# Patient Record
Sex: MALE | Race: OTHER | Employment: UNEMPLOYED | URBAN - NONMETROPOLITAN AREA
[De-identification: names, ages, dates, MRNs, and addresses within clinical notes are randomized per-mention and may not be internally consistent; named-entity substitution may affect disease eponyms.]

---

## 2022-08-29 ENCOUNTER — APPOINTMENT (OUTPATIENT)
Dept: CT IMAGING | Age: 37
End: 2022-08-29

## 2022-08-29 ENCOUNTER — HOSPITAL ENCOUNTER (EMERGENCY)
Age: 37
Discharge: HOME | End: 2022-08-30

## 2022-08-29 DIAGNOSIS — R79.89 ELEVATED LFTS: ICD-10-CM

## 2022-08-29 DIAGNOSIS — R44.3 HALLUCINATION: Primary | ICD-10-CM

## 2022-08-29 DIAGNOSIS — R41.0 CONFUSION: ICD-10-CM

## 2022-08-29 DIAGNOSIS — F10.11 HISTORY OF ALCOHOL ABUSE: ICD-10-CM

## 2022-08-29 LAB
ALBUMIN SERPL-MCNC: 4.8 G/DL (ref 3.5–5.2)
ALP BLD-CCNC: 164 U/L (ref 40–130)
ALT SERPL-CCNC: 242 U/L (ref 5–41)
AMPHETAMINE SCREEN, URINE: NEGATIVE
ANION GAP SERPL CALCULATED.3IONS-SCNC: 15 MMOL/L (ref 7–19)
AST SERPL-CCNC: 325 U/L (ref 5–40)
BACTERIA: NEGATIVE /HPF
BARBITURATE SCREEN URINE: NEGATIVE
BASOPHILS ABSOLUTE: 0 K/UL (ref 0–0.2)
BASOPHILS RELATIVE PERCENT: 0.8 % (ref 0–1)
BENZODIAZEPINE SCREEN, URINE: POSITIVE
BILIRUB SERPL-MCNC: 1.8 MG/DL (ref 0.2–1.2)
BILIRUBIN URINE: ABNORMAL
BLOOD, URINE: NEGATIVE
BUN BLDV-MCNC: 11 MG/DL (ref 6–20)
BUPRENORPHINE URINE: NEGATIVE
CALCIUM SERPL-MCNC: 9.2 MG/DL (ref 8.6–10)
CANNABINOID SCREEN URINE: NEGATIVE
CHLORIDE BLD-SCNC: 102 MMOL/L (ref 98–111)
CLARITY: CLEAR
CO2: 20 MMOL/L (ref 22–29)
COCAINE METABOLITE SCREEN URINE: NEGATIVE
COLOR: ABNORMAL
CREAT SERPL-MCNC: 0.7 MG/DL (ref 0.5–1.2)
CRYSTALS, UA: ABNORMAL /HPF
EOSINOPHILS ABSOLUTE: 0.1 K/UL (ref 0–0.6)
EOSINOPHILS RELATIVE PERCENT: 2.4 % (ref 0–5)
EPITHELIAL CELLS, UA: 0 /HPF (ref 0–5)
ETHANOL: <10 MG/DL (ref 0–0.08)
GFR AFRICAN AMERICAN: >59
GFR NON-AFRICAN AMERICAN: >60
GLUCOSE BLD-MCNC: 97 MG/DL (ref 74–109)
GLUCOSE URINE: NEGATIVE MG/DL
HCT VFR BLD CALC: 38 % (ref 42–52)
HEMOGLOBIN: 12.6 G/DL (ref 14–18)
HYALINE CASTS: 0 /HPF (ref 0–8)
IMMATURE GRANULOCYTES #: 0 K/UL
KETONES, URINE: NEGATIVE MG/DL
LEUKOCYTE ESTERASE, URINE: ABNORMAL
LYMPHOCYTES ABSOLUTE: 0.9 K/UL (ref 1.1–4.5)
LYMPHOCYTES RELATIVE PERCENT: 17.8 % (ref 20–40)
Lab: ABNORMAL
MAGNESIUM: 2.2 MG/DL (ref 1.6–2.6)
MCH RBC QN AUTO: 31.6 PG (ref 27–31)
MCHC RBC AUTO-ENTMCNC: 33.2 G/DL (ref 33–37)
MCV RBC AUTO: 95.2 FL (ref 80–94)
METHADONE SCREEN, URINE: NEGATIVE
METHAMPHETAMINE, URINE: NEGATIVE
MONOCYTES ABSOLUTE: 0.6 K/UL (ref 0–0.9)
MONOCYTES RELATIVE PERCENT: 13.1 % (ref 0–10)
NEUTROPHILS ABSOLUTE: 3.2 K/UL (ref 1.5–7.5)
NEUTROPHILS RELATIVE PERCENT: 65.7 % (ref 50–65)
NITRITE, URINE: NEGATIVE
OPIATE SCREEN URINE: NEGATIVE
OXYCODONE URINE: NEGATIVE
PDW BLD-RTO: 12.2 % (ref 11.5–14.5)
PH UA: 5.5 (ref 5–8)
PHENCYCLIDINE SCREEN URINE: NEGATIVE
PLATELET # BLD: 138 K/UL (ref 130–400)
PMV BLD AUTO: 10.5 FL (ref 9.4–12.4)
POTASSIUM SERPL-SCNC: 3.7 MMOL/L (ref 3.5–5)
PROPOXYPHENE SCREEN: NEGATIVE
PROTEIN UA: NEGATIVE MG/DL
RBC # BLD: 3.99 M/UL (ref 4.7–6.1)
RBC UA: 0 /HPF (ref 0–4)
SARS-COV-2, NAAT: NOT DETECTED
SODIUM BLD-SCNC: 137 MMOL/L (ref 136–145)
SPECIFIC GRAVITY UA: 1.02 (ref 1–1.03)
TOTAL PROTEIN: 7.9 G/DL (ref 6.6–8.7)
TRICYCLIC, URINE: NEGATIVE
UROBILINOGEN, URINE: 1 E.U./DL
WBC # BLD: 4.9 K/UL (ref 4.8–10.8)
WBC UA: 0 /HPF (ref 0–5)

## 2022-08-29 PROCEDURE — 85025 COMPLETE CBC W/AUTO DIFF WBC: CPT

## 2022-08-29 PROCEDURE — 36415 COLL VENOUS BLD VENIPUNCTURE: CPT

## 2022-08-29 PROCEDURE — 81001 URINALYSIS AUTO W/SCOPE: CPT

## 2022-08-29 PROCEDURE — 6360000002 HC RX W HCPCS: Performed by: PHYSICIAN ASSISTANT

## 2022-08-29 PROCEDURE — 96374 THER/PROPH/DIAG INJ IV PUSH: CPT

## 2022-08-29 PROCEDURE — 99285 EMERGENCY DEPT VISIT HI MDM: CPT

## 2022-08-29 PROCEDURE — 83735 ASSAY OF MAGNESIUM: CPT

## 2022-08-29 PROCEDURE — 70450 CT HEAD/BRAIN W/O DYE: CPT

## 2022-08-29 PROCEDURE — 80306 DRUG TEST PRSMV INSTRMNT: CPT

## 2022-08-29 PROCEDURE — 74177 CT ABD & PELVIS W/CONTRAST: CPT

## 2022-08-29 PROCEDURE — 82077 ASSAY SPEC XCP UR&BREATH IA: CPT

## 2022-08-29 PROCEDURE — 6360000004 HC RX CONTRAST MEDICATION: Performed by: PHYSICIAN ASSISTANT

## 2022-08-29 PROCEDURE — 87635 SARS-COV-2 COVID-19 AMP PRB: CPT

## 2022-08-29 PROCEDURE — 96375 TX/PRO/DX INJ NEW DRUG ADDON: CPT

## 2022-08-29 PROCEDURE — 2580000003 HC RX 258: Performed by: PHYSICIAN ASSISTANT

## 2022-08-29 PROCEDURE — 80053 COMPREHEN METABOLIC PANEL: CPT

## 2022-08-29 RX ORDER — DIAZEPAM 5 MG/1
5 TABLET ORAL EVERY 8 HOURS PRN
COMMUNITY

## 2022-08-29 RX ORDER — 0.9 % SODIUM CHLORIDE 0.9 %
1000 INTRAVENOUS SOLUTION INTRAVENOUS ONCE
Status: COMPLETED | OUTPATIENT
Start: 2022-08-29 | End: 2022-08-29

## 2022-08-29 RX ORDER — THIAMINE HYDROCHLORIDE 100 MG/ML
100 INJECTION, SOLUTION INTRAMUSCULAR; INTRAVENOUS DAILY
Status: DISCONTINUED | OUTPATIENT
Start: 2022-08-29 | End: 2022-08-30 | Stop reason: ALTCHOICE

## 2022-08-29 RX ORDER — LORAZEPAM 2 MG/ML
1 INJECTION INTRAMUSCULAR ONCE
Status: COMPLETED | OUTPATIENT
Start: 2022-08-29 | End: 2022-08-29

## 2022-08-29 RX ORDER — ONDANSETRON 2 MG/ML
4 INJECTION INTRAMUSCULAR; INTRAVENOUS ONCE
Status: COMPLETED | OUTPATIENT
Start: 2022-08-29 | End: 2022-08-29

## 2022-08-29 RX ADMIN — THIAMINE HYDROCHLORIDE 100 MG: 100 INJECTION, SOLUTION INTRAMUSCULAR; INTRAVENOUS at 21:23

## 2022-08-29 RX ADMIN — ONDANSETRON 4 MG: 2 INJECTION INTRAMUSCULAR; INTRAVENOUS at 21:22

## 2022-08-29 RX ADMIN — IOPAMIDOL 70 ML: 755 INJECTION, SOLUTION INTRAVENOUS at 21:22

## 2022-08-29 RX ADMIN — SODIUM CHLORIDE 1000 ML: 9 INJECTION, SOLUTION INTRAVENOUS at 21:23

## 2022-08-29 RX ADMIN — LORAZEPAM 1 MG: 2 INJECTION, SOLUTION INTRAMUSCULAR; INTRAVENOUS at 22:50

## 2022-08-29 ASSESSMENT — ENCOUNTER SYMPTOMS
VOMITING: 0
NAUSEA: 0

## 2022-08-30 VITALS
SYSTOLIC BLOOD PRESSURE: 124 MMHG | DIASTOLIC BLOOD PRESSURE: 76 MMHG | OXYGEN SATURATION: 97 % | TEMPERATURE: 98.5 F | HEIGHT: 68 IN | RESPIRATION RATE: 20 BRPM | WEIGHT: 130 LBS | BODY MASS INDEX: 19.7 KG/M2 | HEART RATE: 67 BPM

## 2022-08-30 LAB
ALBUMIN SERPL-MCNC: 4.2 G/DL (ref 3.5–5.2)
ALP BLD-CCNC: 146 U/L (ref 40–130)
ALT SERPL-CCNC: 205 U/L (ref 5–41)
AMMONIA: 56 UMOL/L (ref 16–60)
ANION GAP SERPL CALCULATED.3IONS-SCNC: 12 MMOL/L (ref 7–19)
AST SERPL-CCNC: 241 U/L (ref 5–40)
BILIRUB SERPL-MCNC: 1.8 MG/DL (ref 0.2–1.2)
BUN BLDV-MCNC: 9 MG/DL (ref 6–20)
CALCIUM SERPL-MCNC: 8.6 MG/DL (ref 8.6–10)
CHLORIDE BLD-SCNC: 105 MMOL/L (ref 98–111)
CO2: 22 MMOL/L (ref 22–29)
CREAT SERPL-MCNC: 0.6 MG/DL (ref 0.5–1.2)
GFR AFRICAN AMERICAN: >59
GFR NON-AFRICAN AMERICAN: >60
GLUCOSE BLD-MCNC: 112 MG/DL (ref 74–109)
HCT VFR BLD CALC: 35.6 % (ref 42–52)
HEMOGLOBIN: 11.8 G/DL (ref 14–18)
MCH RBC QN AUTO: 31.7 PG (ref 27–31)
MCHC RBC AUTO-ENTMCNC: 33.1 G/DL (ref 33–37)
MCV RBC AUTO: 95.7 FL (ref 80–94)
PDW BLD-RTO: 12.3 % (ref 11.5–14.5)
PLATELET # BLD: 125 K/UL (ref 130–400)
PMV BLD AUTO: 10.4 FL (ref 9.4–12.4)
POTASSIUM SERPL-SCNC: 3.7 MMOL/L (ref 3.5–5)
RBC # BLD: 3.72 M/UL (ref 4.7–6.1)
SODIUM BLD-SCNC: 139 MMOL/L (ref 136–145)
TOTAL PROTEIN: 6.8 G/DL (ref 6.6–8.7)
WBC # BLD: 4.1 K/UL (ref 4.8–10.8)

## 2022-08-30 PROCEDURE — 82140 ASSAY OF AMMONIA: CPT

## 2022-08-30 PROCEDURE — 6360000002 HC RX W HCPCS

## 2022-08-30 PROCEDURE — 36415 COLL VENOUS BLD VENIPUNCTURE: CPT

## 2022-08-30 PROCEDURE — 2580000003 HC RX 258: Performed by: INTERNAL MEDICINE

## 2022-08-30 PROCEDURE — 85027 COMPLETE CBC AUTOMATED: CPT

## 2022-08-30 PROCEDURE — 80053 COMPREHEN METABOLIC PANEL: CPT

## 2022-08-30 RX ORDER — HALOPERIDOL 5 MG/ML
10 INJECTION INTRAMUSCULAR ONCE
Status: COMPLETED | OUTPATIENT
Start: 2022-08-30 | End: 2022-08-30

## 2022-08-30 RX ORDER — CYANOCOBALAMIN 1000 UG/ML
1000 INJECTION INTRAMUSCULAR; SUBCUTANEOUS ONCE
Status: DISCONTINUED | OUTPATIENT
Start: 2022-08-30 | End: 2022-08-30

## 2022-08-30 RX ORDER — HALOPERIDOL 5 MG/ML
10 INJECTION INTRAMUSCULAR ONCE
Status: DISCONTINUED | OUTPATIENT
Start: 2022-08-30 | End: 2022-08-30

## 2022-08-30 RX ORDER — CYANOCOBALAMIN 1000 UG/ML
1000 INJECTION INTRAMUSCULAR; SUBCUTANEOUS ONCE
Status: DISCONTINUED | OUTPATIENT
Start: 2022-08-30 | End: 2022-08-30 | Stop reason: HOSPADM

## 2022-08-30 RX ORDER — THIAMINE HYDROCHLORIDE 100 MG/ML
200 INJECTION, SOLUTION INTRAMUSCULAR; INTRAVENOUS EVERY 12 HOURS
Status: DISCONTINUED | OUTPATIENT
Start: 2022-08-30 | End: 2022-08-30 | Stop reason: HOSPADM

## 2022-08-30 RX ORDER — HALOPERIDOL 5 MG/ML
INJECTION INTRAMUSCULAR
Status: COMPLETED
Start: 2022-08-30 | End: 2022-08-30

## 2022-08-30 RX ORDER — 0.9 % SODIUM CHLORIDE 0.9 %
500 INTRAVENOUS SOLUTION INTRAVENOUS ONCE
Status: COMPLETED | OUTPATIENT
Start: 2022-08-30 | End: 2022-08-30

## 2022-08-30 RX ADMIN — HALOPERIDOL 10 MG: 5 INJECTION INTRAMUSCULAR at 00:52

## 2022-08-30 RX ADMIN — HALOPERIDOL LACTATE 10 MG: 5 INJECTION, SOLUTION INTRAMUSCULAR at 00:52

## 2022-08-30 RX ADMIN — SODIUM CHLORIDE 500 ML: 9 INJECTION, SOLUTION INTRAVENOUS at 05:37

## 2022-08-30 NOTE — ED NOTES
Pt's friend speaking with Magdiel Price, [address] in hallway at this time. PT guided back to bed twice within 4 minutes to eat his meal. Pt looking under his sheets and under cabinets and appears very nervous. Pt asked what he is seeing and he said \"I am looking for my phone. \" Pt back in bed and eating at this time.       Kristine Celeste RN  08/30/22 [id number]

## 2022-08-30 NOTE — ED NOTES
PT resting in bed with spouse and friend at bedside. Pt began pulling at his cardiac monitor and becoming increasingly agitated. Pt raised his fist at nurse twice but didn't try to hit her. Security notified. Emotional support provided to pt's family. Cardiac monitoring maintained 1-1 observation maintained from nurses station.       Stephanie Zavaleta RN  08/30/22 2845

## 2022-08-30 NOTE — ED NOTES
Pt pacing in room, pt guided back into bed without incident. PT appears confused and verbalizes he is still hallucinating. Emotional support provided. No distress noted.       Jed Scherer RN  08/30/22 [id number]

## 2022-08-30 NOTE — CARE COORDINATION
ELIZ returned call to ED re: bed 6 that has been here 18h and has no ride back to [facility]; spoke with RN-Kirt; he stated that [facility] doesn't have anyone to pick him up here or even at bus stop; his brother left last night and he and all family live in [place] and don't have the money to come pick him up; ELIZ checked with Ernesto-will be more costly than a cab; ELIZ placed call to [facility] cabs; they can arrive in [address] and the cost is 96.00 ([facility] would be 135.00-canceled);     ELIZ faxed cab voucher to     Electronically signed by Clelia Brunner, LSW on 8/30/2022 at 2:55 PM

## 2022-08-30 NOTE — ED NOTES
PT wondering around his room while his friend stepped into hallway. PT removed BP cuff and is fidgeting with equipment. PT guided back to bed, BP cuff and monitor reapplied. Pt still appears confused and unimproved after Ativan earlier. Pt's friend expressed concern about him being discharged home in this state with 3 children at home. Herminia Rivero made aware.       Erendira Weinberg RN  08/30/22 8989

## 2022-08-30 NOTE — SUICIDE SAFETY PLAN
SAFETY PLAN    A suicide Safety Plan is a document that supports someone when they are having thoughts of suicide. Warning Signs that indicate a suicidal crisis may be developing: What (situations, thoughts, feelings, body sensations, behaviors, etc.) do you experience that lets you know you are beginning to think about suicide? 1. Hallucinations  2.   3. Internal Coping Strategies:  What things can I do (relaxation techniques, hobbies, physical activities, etc.) to take my mind off my problems without contacting another person? 1. Stay busy  2.   3.     People and social settings that provide distraction: Who can I call or where can I go to distract me? 1. Name: My brother  Phone:   2. Name: My wife  Phone:   3. Place:       4. Place:     People whom I can ask for help: Who can I call when I need help - for example, friends, family, clergy, someone else? 1. Name:    My brother            Phone:   2. Name:    My wife             Phone:   3. Name:                     Phone:       Professionals or [address] [place] [address] I can contact during a crisis: Who can I call for help - for example, my doctor, my psychiatrist, my psychologist, a mental health provider, a suicide hotline? 1. Clinician Name: [id number]  Phone: [phone]      Clinician Pager or Emergency Contact #: [phone]    2. Clinician Name: [address]  Phone: [phone]      Clinician Pager or Emergency Contact #: [phone]    3. Suicide Prevention Lifeline: 6-468-933-TALK (0573)    4. [facility] Emergency Department  [address] [facility]    Making the environment safe: How can I make my environment (house/apartment/living space) safer? For example, can I remove guns, medications, and other items? 1. Remove weapons from the home  2. Remove extra medications from the home.     The One Thing that is most important to me and worth living for is:  My family

## 2022-08-30 NOTE — PROGRESS NOTES
BOUBACAR ADULT INITIAL INTAKE ASSESSMENT     8/30/22    Dominguez Nino ,a 43 y.o. male, presents to the ED for a psychiatric assessment. ED Arrival time: 08/29 1953  ED physician:  Chuck Melendez [facility] AN AFFILIATE OF [facility] Notification time: 08/30 1706  BOUBACAR Assessment start time: 0900  Psychiatrist call time: 36  Spoke with Dr. Alexys Shields    Patient is referred by: [facility]    Reason for visit to ED - Presenting problem:     PT states reason for ED visit, \"I came to get checked out, because I was seeing animals and wanted to get checked out to make my family know I was okay. I went to [facility] but they had me come here. \"    Patient seen in ED exam room 6 sitting on bed. Patient in [facility] [address] is calm, cooperative and agreeable to interview. Patient reports he stopped drinking alcohol about 6 days ago and had gone to ER in [facility] previously to get medically cleared for rehab. He reports they cleared him but he went to [facility] and was having visual hallucinations of animals running around the room. Originally ED attempted to have patient admitted to medical unit for possible alcohol withdrawals, however hosptialist refused to admit patient, see note in EMR. Patient denies suicidal ideations, homicidal ideations and hallucinations currently. He reports he slept well overnight. CIWA score 4 this am with slight tremor to hands and reporting mild anxiety this am. Vitals stable. Denies history of suicide attempts or previous psychiatric hospitalizations. Plans to return to rehab upon discharge from hospital. Writer attempted to call brother with patient's permission, however, no answer and voicemail full. ED Provider reports: Dominguez Nino is a 43 y.o. male who presents to the emergency department with alcohol problem. PMH significant for alcoholism. Brother at bedside to provide additional history. Patient is a poor historian secondary to confusion and tangential thoughts.   Patient states that he was formerly drinking at least 14-16 beers a day and 3 days ago decided he wanted to quit presenting to a local hospital \"to get me checked out before going to [facility]. \"  Patient states that he then had to go to the ER again this morning and repeats multiple times \"I keep getting checked out for my mom and my family and nothing is wrong. \"  Brother at bedside states that patient has a history of former alcoholism for which he spent 6 months in [place] and became sober, was started on medications, however upon returning home he gradually began drinking more and more. Brother states that patient had his last drink 5 days ago at which time they took him to an ER for medical clearance prior to bringing him to [facility]. Worker from [facility] on the phone states that patient arrived and began having hallucinations, agitation, at which time he requested to leave the facility and was sent home however upon returning home he concerned family members with increased hallucinations at which time they took him to a local hospital.  Brother states that today he was contacted by patient's wife when he was rummaging through the house, talking about animals that he was pulling off of himself as well as strings. Upon arrival to ED patient is actively pulling animals off of him and tossing them on the floor reporting \"I am fine and everyone is lying. \"  Patient reports that he has never used tobacco products and denies use of marijuana or any other illicit substances. Patient denies any recent illnesses or injuries. Brother states that they do not feel safe caring for patient at home at which time they return to the ER.     Duration of symptoms: two days    Current Stressors:  alcohol abuse    C-SSRS Completed: yes    SI:  denies   Plan: no   Past SI attempts: no   If yes, when and how many times:  Describe suicide attempts:   HI: denies  If yes describe:   Delusions: denies  If yes describe:   Hallucinations: denies   If yes describe: Risk of Harm to self: Self injurious/self mutilation behaviorsno   If yes explain:   Was it within the past 6 months: no   Risk of Harm to others: no   If yes explain:   Was it within the past 6 months: no   Trauma History:  Anxiety 1-10:  2  Explain if increased:   Depression 1-10:  0  Explain if increased:   Level of function outside hospital decreased: no   If yes explain:   Has patient been completing ADL's: yes    Mental Status Evaluation:     Appearance:  age appropriate   Behavior:  Within Normal Limits   Speech:  normal pitch and normal volume   Mood:  anxious   Affect:  mood-congruent   Thought Process:  circumstantial   Thought Content:  Denies SI, HI, AVH, not delusional or psychotic   Sensorium:  person, place, time/date, and situation   Cognition:  grossly intact   Insight:  age appropriate         Psychiatric Hospitalizations: No   Where & When: n/a  Outpatient Psychiatric Treatment: denies    Family History:    Family history of mental illness: no   \"Depression\",\"Anxiety\",\"Bipolar\",\"Schizophrenia\",\"Borderline\",\"ADHD\"}  Family members with suicide attempt: no   If yes explain (attempted or completed):    Substance Abuse History:     SBIRT Completed: yes  Brief Intervention completed if needed:  (Yes/No)    Current ETOH LEVELS: < 10    ETOH Usage:     Amount drinking daily: heavy    Date of last drink: 6 days ago  Longest period of sobriety: 6 months    Substance/Chemical Abuse/Recreational Drug History:  Substance used: alcohol  Date of last substance use: 6 days ago  Tobacco Use: no   History of rehab treatment: yes  How many times in rehab: couple  Last time in rehab: currently at [facility]  Family history of substance abuse: alcohol abuse    Opiates: It was discussed with pt they would not be receiving opiates unless they were within 3 days post surgery/acute injury. Patient voiced understanding and agreed.      Psychiatric Review Of Systems:     Recent Sleep changes: no   Recent appetite changes: no   Recent weight changes/Pounds gained (+) or lost (-): no      Medical History:     Medical Diagnosis/Issues: alcohol use disorder  CT today in ED:no  Use of 02 or CPAP: no  Ambulatory: yes  Independent or Need assistance with Self Care:     PCP: No primary care provider on file. Current Medications:   Scheduled Meds:   Current Facility-Administered Medications:     cyanocobalamin injection 1,000 mcg, 1,000 mcg, IntraMUSCular, Once, Anneliese Malcolm PA-C    lactulose enema, , Rectal, Once, Zane Reyes MD    thiamine (B-1) injection 200 mg, 200 mg, IntraVENous, Q12H, Zane Reyes MD    Current Outpatient Medications:     diazePAM (VALIUM) 5 MG tablet, Take 5 mg by mouth every 8 hours as needed for Anxiety. , Disp: , Rfl:        Collateral Information:     Name: unable to reach brother Migue  Relationship:   Phone Number:   Collateral:     Current living arrangement:Lives at home with wife, kids, brother, sister-in-law  Current Support System: family  Employment:     Disposition:     Choose one of the options below for disposition:     1. Decision to admit to :no    I  Does the patient have a guardian or Medical POA: no  Has the guardian been notified or Medical POA:       2. Decision to Discharge:   Does not meet criteria for acceptance to   unit due to: denies SI, HI,AVH, not delusional or psychotic. Safety plan completed. Patient wants to discharge with plan to return to [facility].     3. Transferred:       Patient was transferred due to: n/a    Other follow up information provided:      Yariel Reynolds RN

## 2022-08-30 NOTE — ED NOTES
[place] and milk provided. KITA Marrero at b/s at this time.       Garo Brand RN  08/30/22 0025

## 2022-08-30 NOTE — SIGNIFICANT EVENT
Called by ER JOSE Borja    To admit patient for agitation and hallucinations. Patient has been to three different facilities since august 26, 2022 and unable to stay at any facility to get treated. He has been off alcohol per family and friend and brother for the last six days. Most likely acute alcohol withdrawal he has already gotten thru and thus the agitative combative   Phase of withdrawal are over   Seizures risk is extremely low     He left the alcohol rehab facility as well,   \"It was not for him\"    Evaluated for a second time at the same hospital for psychosis and hallucination and family not able to control him     What will hospitalization achieve for this gentleman. :     We are going to admit him and calculate his ciwa score and give him ativan. Thus effectively sedating him and having him go thru withdrawal phase therapy again. The sx he is exhibiting are not withdrawal any longer. He is agitated due to lack of alcohol and craving it just as he was at home, he will continue to do at hospital.     Besides giving him  haldol and geodon and ativan, there is nothing else except family to be present to redirect activity, (which is more like delirium than hallucinations and psychosis as he could not be redirected. ). .    He needs a formal psych evaluation first and foremost and dx what these hallucinations are coming from. ? Kalli's korsakoff's dementia. Ammonia related elevation     Sedating him and putting him in medical floor without appropriate staff and sitters to care for him is unsafe for all involved. Etiology of what is occurring is not known     He needs a neuro consult and psych consult and not repeated sedation to get him thru this. He will awaken from sedation and do the same. This is no longer alcohol withdrawal if the story is correct per family that his last drink was six days ago.      Evaluate and monitor in er   And proceed with psych evaluation first and foremost.     Then decide on controlled admission with a plan to achieve.      I am not agreeable to simply admitting him and putting him on ciwa scale for etoh withdrawal.

## 2022-08-30 NOTE — ED PROVIDER NOTES
[facility] EMERGENCY DEPT  EMERGENCY DEPARTMENT ENCOUNTER      Pt Name: Yin Wise  MRN: [id number]  [place] 1985  Date of evaluation: 8/29/2022  Provider: Brad Gardner PA-C    CHIEF COMPLAINT       Chief Complaint   Patient presents with    Alcohol Problem     At [facility] rehab, having hallucinations from detoxing; needs medical clearance to return to rehab           HISTORY OF PRESENT ILLNESS   (Location/Symptom, Timing/Onset, Context/Setting, Quality, Duration, Modifying Factors, Severity)  Note limiting factors. HPI      Yin Wise is a 43 y.o. male who presents to the emergency department with alcohol problem. PMH significant for alcoholism. Brother at bedside to provide additional history. Patient is a poor historian secondary to confusion and tangential thoughts. Patient states that he was formerly drinking at least 14-16 beers a day and 3 days ago decided he wanted to quit presenting to a local hospital \"to get me checked out before going to [facility]. \"  Patient states that he then had to go to the ER again this morning and repeats multiple times \"I keep getting checked out for my mom and my family and nothing is wrong. \"  Brother at bedside states that patient has a history of former alcoholism for which he spent 6 months in [place] and became sober, was started on medications, however upon returning home he gradually began drinking more and more. Brother states that patient had his last drink 5 days ago at which time they took him to an ER for medical clearance prior to bringing him to [facility].   Worker from [facility] on the phone states that patient arrived and began having hallucinations, agitation, at which time he requested to leave the facility and was sent home however upon returning home he concerned family members with increased hallucinations at which time they took him to a local hospital.  Brother states that today he was contacted by patient's wife when he was rummaging through the house, talking about animals that he was pulling off of himself as well as strings. Upon arrival to ED patient is actively pulling animals off of him and tossing them on the floor reporting \"I am fine and everyone is lying. \"  Patient reports that he has never used tobacco products and denies use of marijuana or any other illicit substances. Patient denies any recent illnesses or injuries. Brother states that they do not feel safe caring for patient at home at which time they return to the ER. Records reviewed show patient was seen earlier today at [facility] for hallucinations, alcohol withdrawal syndrome with delirium. Patient was sent home with a prescription for Valium. Patient was also seen on 8/26/2022 at the [facility] ER for alcoholic intoxication, encounter for medical clearance. Nursing Notes were reviewed. REVIEW OF SYSTEMS    (2-9 systems for level 4, 10 or more for level 5)     Review of Systems   Reason unable to perform ROS: Limited ability to obtain ROS due to hallucinations, brother at bedside to provide additional history. Gastrointestinal:  Negative for nausea and vomiting. Skin:  Negative for wound. Psychiatric/Behavioral:  Positive for agitation, confusion, hallucinations (visual) and sleep disturbance. Negative for self-injury. Except as noted above the remainder of the review of systems was reviewed and negative. PAST MEDICAL HISTORY   No past medical history on file. SURGICAL HISTORY     No past surgical history on file. CURRENT MEDICATIONS       Previous Medications    DIAZEPAM (VALIUM) 5 MG TABLET    Take 5 mg by mouth every 8 hours as needed for Anxiety. ALLERGIES     Patient has no known allergies. FAMILY HISTORY     No family history on file.        SOCIAL HISTORY       Social History     Socioeconomic History    Marital status:        SCREENINGS         C[place]lle Coma Scale  Eye Opening: Spontaneous  Best Verbal Response: Oriented  Best Motor Response: Obeys commands  [place] Coma Scale Score: 15                     CIWA Assessment  BP: 117/81  Heart Rate: 83                 PHYSICAL EXAM    (up to 7 for level 4, 8 or more for level 5)     ED Triage Vitals [08/29/22 2000]   BP Temp Temp src Heart Rate Resp SpO2 Height Weight   131/82 98.5 °F (36.9 °C) -- 83 18 97 % -- --       Physical Exam  Vitals and nursing note reviewed. Constitutional:       General: He is in acute distress. Appearance: Normal appearance. He is well-developed, well-groomed and normal weight. He is not diaphoretic. HENT:      Head: Normocephalic and atraumatic. Mouth/Throat:      Mouth: Mucous membranes are moist.      Pharynx: Oropharynx is clear. Eyes:      Conjunctiva/sclera: Conjunctivae normal.      Pupils: Pupils are equal, round, and reactive to light. Cardiovascular:      Rate and Rhythm: Normal rate and regular rhythm. Pulmonary:      Effort: Pulmonary effort is normal.      Breath sounds: Normal breath sounds. Abdominal:      General: Bowel sounds are normal.      Palpations: Abdomen is soft. Musculoskeletal:         General: No signs of injury. Normal range of motion. Cervical back: Normal range of motion and neck supple. Skin:     General: Skin is warm and dry. Neurological:      Mental Status: He is alert and oriented to person, place, and time. Gait: Gait normal.   Psychiatric:         Attention and Perception: He perceives visual (Throughout evaluation patient picking animals off of his lower extremities and tossing them on the ground as well as pulling strings off himself) hallucinations. He does not perceive auditory hallucinations. Mood and Affect: Mood is anxious. Speech: Speech is tangential.         Behavior: Behavior is cooperative.        DIAGNOSTIC RESULTS     EKG: All EKG's are interpreted by the Emergency Department Physician who either signs or Co-signs this chart in the absence of a cardiologist.        RADIOLOGY:   Non-plain film images such as CT, Ultrasound and MRI are read by the radiologist. Plain radiographic images are visualized and preliminarily interpreted by the emergency physician with the below findings:        Interpretation per the Radiologist below, if available at the time of this note:    CT ABDOMEN PELVIS W IV CONTRAST Additional Contrast? None   Final Result   1. No evidence for acute abdominal or pelvic process. CT HEAD WO CONTRAST   Final Result   1. No evidence of acute intracranial abnormality. LABS:  Labs Reviewed   CBC WITH AUTO DIFFERENTIAL - Abnormal; Notable for the following components:       Result Value    RBC 3.99 (*)     Hemoglobin 12.6 (*)     Hematocrit 38.0 (*)     MCV 95.2 (*)     MCH 31.6 (*)     Neutrophils % 65.7 (*)     Lymphocytes % 17.8 (*)     Monocytes % 13.1 (*)     Lymphocytes Absolute 0.9 (*)     All other components within normal limits   COMPREHENSIVE METABOLIC PANEL - Abnormal; Notable for the following components:    CO2 20 (*)     Total Bilirubin 1.8 (*)     Alkaline Phosphatase 164 (*)      (*)      (*)     All other components within normal limits   URINALYSIS WITH REFLEX TO CULTURE - Abnormal; Notable for the following components:    Color, UA ORANGE (*)     Bilirubin Urine SMALL (*)     Leukocyte Esterase, Urine TRACE (*)     All other components within normal limits   DRUG SCRN, BUPRENORPHINE - Abnormal; Notable for the following components:    Benzodiazepine Screen, Urine POSITIVE (*)     All other components within normal limits   MICROSCOPIC URINALYSIS - Abnormal; Notable for the following components:    Bacteria, UA NEGATIVE (*)     Crystals, UA NEG (*)     All other components within normal limits   COVID-19, RAPID   ETHANOL   MAGNESIUM       All other labs were within normal range or not returned as of this dictation.     EMERGENCY DEPARTMENT COURSE and DIFFERENTIAL DIAGNOSIS/MDM:   Vitals: signed)           Danilo Huynh PA-C  08/30/22 2752